# Patient Record
Sex: MALE
[De-identification: names, ages, dates, MRNs, and addresses within clinical notes are randomized per-mention and may not be internally consistent; named-entity substitution may affect disease eponyms.]

---

## 2018-05-02 ENCOUNTER — HOSPITAL ENCOUNTER (EMERGENCY)
Dept: HOSPITAL 97 - ER | Age: 23
Discharge: HOME | End: 2018-05-02
Payer: SELF-PAY

## 2018-05-02 VITALS — DIASTOLIC BLOOD PRESSURE: 70 MMHG | SYSTOLIC BLOOD PRESSURE: 130 MMHG

## 2018-05-02 VITALS — OXYGEN SATURATION: 100 %

## 2018-05-02 VITALS — TEMPERATURE: 98 F
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
## 2018-05-02 DIAGNOSIS — M25.511: Primary | ICD-10-CM
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
## 2018-05-02 PROCEDURE — 96372 THER/PROPH/DIAG INJ SC/IM: CPT

## 2018-05-02 PROCEDURE — 99284 EMERGENCY DEPT VISIT MOD MDM: CPT

## 2018-05-02 NOTE — RAD REPORT
EXAM DESCRIPTION:  RAD - Shoulder  Right 2 View - 5/2/2018 3:55 pm

 

CLINICAL HISTORY:  Right shoulder pain status post fall

 

FINDINGS:  No fracture or dislocation is seen.

## 2018-05-02 NOTE — EDPHYS
Physician Documentation                                                                           

 [facility]                                                                

Name: Fareed Dietrich                                                                                

Age: 22 yrs                                                                                       

Sex: Male                                                                                         

: 1995                                                                                   

MRN: [id number]                                                                                   

Arrival Date: 2018                                                                          

Time: 15:15                                                                                       

Account#: [id number]                                                                            

Bed 24                                                                                            

Private MD: None, None                                                                            

ED Physician Dex Dominguez                                                                      

HPI:                                                                                              

                                                                                             

15:43 This 22 yrs old  Male presents to ER via Ambulatory with complaints of Shoulder snw 

      Injury.                                                                                     

15:43 The patient or guardian complains of a crush injury, fell onto right shoulder. right    snw 

      shoulder. Context: resulted from a fall, playing sports, softball, The patient              

      experiences decreased range of motion, The patient reports no obvious deformity. Onset:     

      The symptoms/episode began/occurred suddenly, last night. Associated signs and              

      symptoms: Pertinent positives: pain and decreased ROM. Severity of symptoms: At their       

      worst the symptoms were moderate. The patient has not experienced similar symptoms in       

      the past. The patient has not recently seen a physician.                                    

                                                                                                  

Historical:                                                                                       

- Allergies:                                                                                      

15:19 No Known Allergies;                                                                     aj  

- Home Meds:                                                                                      

15:19 None [Active];                                                                          aj  

- PMHx:                                                                                           

15:19 None;                                                                                   aj  

- PSHx:                                                                                           

15:19 None;                                                                                   aj  

                                                                                                  

- Immunization history:: Adult Immunizations up to date.                                          

- Social history:: Smoking status: Patient uses tobacco products, denies chronic                  

  smoking, but will smoke occasionally.                                                           

                                                                                                  

                                                                                                  

ROS:                                                                                              

15:43 Constitutional: Negative for fever, chills, and weight loss, Eyes: Negative for injury, snw 

      pain, redness, and discharge, ENT: Negative for injury, pain, and discharge, Neck:          

      Negative for injury, pain, and swelling, Cardiovascular: Negative for chest pain,           

      palpitations, and edema, Respiratory: Negative for shortness of breath, cough,              

      wheezing, and pleuritic chest pain, Abdomen/GI: Negative for abdominal pain, nausea,        

      vomiting, diarrhea, and constipation, Back: Negative for injury and pain, : Negative      

      for injury, bleeding, discharge, and swelling, Skin: Negative for injury, rash, and         

      discoloration, Neuro: Negative for headache, weakness, numbness, tingling, and seizure.     

15:43 MS/extremity: Positive for injury or acute deformity, contusion, decreased range of         

      motion, pain, of the anterior aspect of right shoulder.                                     

                                                                                                  

Exam:                                                                                             

15:43 Constitutional:  This is a well developed, well nourished patient who is awake, alert,  snw 

      and in no acute distress. Head/Face:  Normocephalic, atraumatic. Eyes:  Pupils equal        

      round and reactive to light, extra-ocular motions intact.  Lids and lashes normal.          

      Conjunctiva and sclera are non-icteric and not injected.  Cornea within normal limits.      

      Periorbital areas with no swelling, redness, or edema. ENT:  Nares patent. No nasal         

      discharge, no septal abnormalities noted.  Tympanic membranes are normal and external       

      auditory canals are clear.  Oropharynx with no redness, swelling, or masses, exudates,      

      or evidence of obstruction, uvula midline.  Mucous membranes moist. Neck:  Trachea          

      midline, no thyromegaly or masses palpated, and no cervical lymphadenopathy.  Supple,       

      full range of motion without nuchal rigidity, or vertebral point tenderness.  No            

      Meningismus. Chest/axilla:  Normal chest wall appearance and motion.  Nontender with no     

      deformity.  No lesions are appreciated. Cardiovascular:  Regular rate and rhythm with a     

      normal S1 and S2.  No gallops, murmurs, or rubs.  Normal PMI, no JVD.  No pulse             

      deficits. Respiratory:  Lungs have equal breath sounds bilaterally, clear to                

      auscultation and percussion.  No rales, rhonchi or wheezes noted.  No increased work of     

      breathing, no retractions or nasal flaring. Abdomen/GI:  Soft, non-tender, with normal      

      bowel sounds.  No distension or tympany.  No guarding or rebound.  No evidence of           

      tenderness throughout. Back:  No spinal tenderness.  No costovertebral tenderness.          

      Full range of motion. Skin:  Warm, dry with normal turgor.  Normal color with no            

      rashes, no lesions, and no evidence of cellulitis. Neuro:  Awake and alert, GCS 15,         

      oriented to person, place, time, and situation.  Cranial nerves II-XII grossly intact.      

      Motor strength 5/5 in all extremities.  Sensory grossly intact.  Cerebellar exam            

      normal.  Normal gait. Psych:  Awake, alert, with orientation to person, place and time.     

       Behavior, mood, and affect are within normal limits.                                       

15:43 Musculoskeletal/extremity: Extremities: grossly normal except: ROM: limited active          

      range of motion, in the right shoulder, limited active range of motion due to pain,         

      Circulation is intact in all extremities. Sensation intact. Compartment Syndrome exam       

      of affected extremity: is normal. Joints: All joints are normal except the  right           

      shoulder displays limited range of motion, painful range of motion.                         

                                                                                                  

Vital Signs:                                                                                      

15:19  / 67; Pulse 60; Resp 16; Temp 98.0; Pulse Ox 99% on R/A; Weight 81.65 kg; Height aj  

      5 ft. 10 in. (177.80 cm); Pain 9/10;                                                        

15:57  / 72; Pulse 60; Resp 15; Pulse Ox 100% on R/A;                                   kr2 

17:33  / 70; Pulse 62; Resp 16; Pulse Ox 100% on R/A;                                   kr2 

15:19 Body Mass Index 25.83 (81.65 kg, 177.80 cm)                                             aj  

                                                                                                  

MDM:                                                                                              

15:29 Patient medically screened.                                                             snw 

16:43 Data reviewed: vital signs, nurses notes. Data interpreted: Pulse oximetry: on room air snw 

      is 100 %. Interpretation: normal. Counseling: I had a detailed discussion with the          

      patient and/or guardian regarding: the historical points, exam findings, and any            

      diagnostic results supporting the discharge/admit diagnosis, radiology results, the         

      need for outpatient follow up, to return to the emergency department if symptoms worsen     

      or persist or if there are any questions or concerns that arise at home. Special            

      discussion: I have referred the patient to see his PCP for further evaluation of high       

      blood pressure. Based on the history and exam findings, there is no indication for          

      further emergent testing or inpatient evaluation. I discussed with the patient/guardian     

      the need to see the orthopedic surgeon for further evaluation of the symptoms. I            

      discussed with the patient/guardian the need to see the primary care provider for           

      further evaluation of the symptoms.                                                         

                                                                                                  

                                                                                             

15:32 Order name: Shoulder Right (2 View) XRAY                                                kr2 

                                                                                             

16:42 Order name: Sling; Complete Time: 17:18                                                 snw 

                                                                                                  

Administered Medications:                                                                         

16:41 Drug: TORadol 60 mg Route: IM; Site: right gluteus;                                     kr2 

17:18 Follow up: Response: No adverse reaction; Pain is decreased                             kr2 

                                                                                                  

                                                                                                  

Disposition:                                                                                      

18 16:41 Discharged to Home. Impression: Pain in right shoulder.                            

- Condition is Stable.                                                                            

- Discharge Instructions: Arthralgia, Shoulder Pain, Arm Sling Use, Easy-to-Read,                 

  Cryotherapy, Easy-to-Read, Heat Therapy.                                                        

- Prescriptions for Diclofenac Sodium 75 mg Oral Tablet Sustained Release - take 1                

  tablet by ORAL route 2 times per day; 30 tablet. orphenadrine citrate 100 mg Oral               

  Tablet Sustained Release - take 1 tablet by ORAL route 2 times per day As needed; 20            

  tablet.                                                                                         

- Medication Reconciliation Form, Thank You Letter, Antibiotic Education, Prescription            

  Opioid Use, Work release form form.                                                             

- Follow up: Private Physician; When: 2 - 3 days; Reason: Recheck today's complaints,             

  Continuance of care, Re-evaluation by your physician. Follow up: Emergency                      

  Department; When: As needed; Reason: Worsening of condition.                                    

                                                                                                  

                                                                                                  

                                                                                                  

Addendum:                                                                                         

2018                                                                                        

     06:43 Co-signature as Attending Physician, Dex Dominguez MD I agree with the assessment and  w
a

           plan of care.                                                                          

                                                                                                  

Signatures:                                                                                       

Dispatcher [employer]                           Adrienne Vicente, YULISSA                       RN   Nanci Mckenzie, FNP-C                 FNP-Csnw                                                  

Dex Dominguez MD MD wa Reaves, Karey, RN                       RN   kr2                                                  

                                                                                                  

Corrections: (The following items were deleted from the chart)                                    

                                                                                             

17:34 16:41 2018 16:41 Discharged to Home. Impression: Pain in right shoulder.          kr2 

      Condition is Stable. Forms are Medication Reconciliation Form, Thank You Letter,            

      Antibiotic Education, Prescription Opioid Use. Follow up: Private Physician; When: 2 -      

      3 days; Reason: Recheck today's complaints, Continuance of care, Re-evaluation by your      

      physician. Follow up: Emergency Department; When: As needed; Reason: Worsening of           

      condition. snw                                                                              

                                                                                                  

**************************************************************************************************

## 2018-05-02 NOTE — ER
Nurse's Notes                                                                                     

 [facility]                                                                

Name: Fareed Dietrich                                                                                

Age: 22 yrs                                                                                       

Sex: Male                                                                                         

: 1995                                                                                   

MRN: [id number]                                                                                   

Arrival Date: 2018                                                                          

Time: 15:15                                                                                       

Account#: [id number]                                                                            

Bed 24                                                                                            

Private MD: None, None                                                                            

Diagnosis: Pain in right shoulder                                                                 

                                                                                                  

Presentation:                                                                                     

                                                                                             

15:17 Presenting complaint: Patient states: Right shoulder pain that started last night after aj  

      "diving for a ball while playing men's softball". Transition of care: patient was not       

      received from another setting of care. Onset of symptoms was May 01, 2018. Initial          

      Sepsis Screen: Does the patient meet any 2 criteria? No. Patient's initial sepsis           

      screen is negative. Does the patient have a suspected source of infection? No.              

      Patient's initial sepsis screen is negative. Care prior to arrival: None.                   

15:17 Method Of Arrival: Ambulatory                                                           aj  

15:17 Acuity: KAREEN 4                                                                           aj  

                                                                                                  

Triage Assessment:                                                                                

15:19 General: Appears in no apparent distress. comfortable, Behavior is calm, cooperative,   aj  

      appropriate for age. Pain: Complains of pain in anterior aspect of right shoulder.          

      Neuro: Level of Consciousness is awake, alert, obeys commands, Oriented to person,          

      place, time, situation, Appropriate for age. Respiratory: Airway is patent Respiratory      

      effort is even, unlabored, Respiratory pattern is regular, symmetrical. Derm: Skin is       

      intact, is healthy with good turgor, Skin is pink, warm \T\ dry. normal. Musculoskeletal:   

      Reports pain in anterior aspect of right shoulder and posterior aspect of right             

      shoulder.                                                                                   

                                                                                                  

Historical:                                                                                       

- Allergies:                                                                                      

15:19 No Known Allergies;                                                                       

- Home Meds:                                                                                      

15:19 None [Active];                                                                            

- PMHx:                                                                                           

15:19 None;                                                                                     

- PSHx:                                                                                           

15:19 None;                                                                                   aj  

                                                                                                  

- Immunization history:: Adult Immunizations up to date.                                          

- Social history:: Smoking status: Patient uses tobacco products, denies chronic                  

  smoking, but will smoke occasionally.                                                           

                                                                                                  

                                                                                                  

Screening:                                                                                        

15:25 Abuse screen: Denies threats or abuse. Denies injuries from another. Nutritional        kr2 

      screening: No deficits noted. Tuberculosis screening: No symptoms or risk factors           

      identified. Fall Risk None identified.                                                      

                                                                                                  

Assessment:                                                                                       

15:25 General: Appears in no apparent distress. comfortable, well groomed, well developed,    kr2 

      well nourished, Behavior is calm, cooperative, appropriate for age. Pain: Complains of      

      pain in right shoulder Pain radiates to right arm Pain currently is 0 out of 10 on a        

      pain scale. at worst was 8 out of 10 on a pain scale. Quality of pain is described as       

      sharp, shooting, Pain began last night Is intermittent, Alleviated by rest, Aggravated      

      by lifting arm. Neuro: Level of Consciousness is awake, alert, obeys commands, Oriented     

      to person, place, time, situation. Neuro: Intact. Cardiovascular: Capillary refill < 3      

      seconds in bilateral fingers Patient's skin is warm and dry. Respiratory: Airway is         

      patent Respiratory effort is even, unlabored, Respiratory pattern is regular,               

      symmetrical. GI: Abdomen is flat, non-distended. : No signs and/or symptoms were          

      reported regarding the genitourinary system. EENT: Oral mucosa is moist. Derm: Skin is      

      intact, is healthy with good turgor, Skin is pink, warm \T\ dry. Musculoskeletal:           

      Circulation, motion, and sensation intact. Injury Description: Patient states he fell       

      on his shoulder last night playing soft ball. States he felt a "pop".                       

16:30 Reassessment: Patient appears in no apparent distress at this time. Patient and/or      kr2 

      family updated on plan of care and expected duration. Pain level reassessed. Patient is     

      alert, oriented x 3, equal unlabored respirations, skin warm/dry/pink. Patient states       

      symptoms have improved.                                                                     

17:33 Reassessment: Patient appears in no apparent distress at this time. Patient and/or      kr2 

      family updated on plan of care and expected duration. Pain level reassessed. Patient is     

      alert, oriented x 3, equal unlabored respirations, skin warm/dry/pink. Patient states       

      symptoms have improved.                                                                     

                                                                                                  

Vital Signs:                                                                                      

15:19  / 67; Pulse 60; Resp 16; Temp 98.0; Pulse Ox 99% on R/A; Weight 81.65 kg; Height aj  

      5 ft. 10 in. (177.80 cm); Pain 9/10;                                                        

15:57  / 72; Pulse 60; Resp 15; Pulse Ox 100% on R/A;                                   kr2 

17:33  / 70; Pulse 62; Resp 16; Pulse Ox 100% on R/A;                                   kr2 

15:19 Body Mass Index 25.83 (81.65 kg, 177.80 cm)                                               

                                                                                                  

ED Course:                                                                                        

15:15 Patient arrived in ED.                                                                  mr  

15:15 None, None is Private Physician.                                                        mr  

15:18 Triage completed.                                                                       aj  

15:19 Arm band placed on left wrist. Patient placed in an exam room.                          aj  

15:20 Janette Valdes, RN is Primary Nurse.                                                     kr2 

15:25 Patient has correct armband on for positive identification. Bed in low position. Call   kr2 

      light in reach. Side rails up X 1. Adult w/ patient. Pulse ox on. NIBP on. Door closed.     

      Head of bed elevated.                                                                       

15:26 [facility]                                                        snw 

15:26 Dex Dominguez MD is Attending Physician.                                             snw 

15:52 X-ray completed. Portable x-ray completed in exam room. Patient tolerated procedure     ml  

      well.                                                                                       

15:53 Shoulder Right (2 View) XRAY In Process Unspecified.                                    EDMS

17:00 Sling applied to right arm.                                                             kr2 

17:19 No provider procedures requiring assistance completed. Patient did not have IV access   kr2 

      during this emergency room visit.                                                           

                                                                                                  

Administered Medications:                                                                         

16:41 Drug: TORadol 60 mg Route: IM; Site: right gluteus;                                     kr2 

17:18 Follow up: Response: No adverse reaction; Pain is decreased                             kr2 

                                                                                                  

                                                                                                  

Outcome:                                                                                          

16:41 Discharge ordered by MD.                                                                snw 

17:19 Discharged to home ambulatory, with family.                                             kr2 

17:19 Condition: good                                                                             

17:19 Discharge instructions given to patient, family, Instructed on discharge instructions,      

      follow up and referral plans. Demonstrated understanding of instructions, follow-up         

      care.                                                                                       

17:34 Patient left the ED.                                                                    kr2 

                                                                                                  

Signatures:                                                                                       

Dispatcher [employer]                           Adrienne Vicente, RN                       Nanci Swain FNP-C FNP-Nikc                                                  

Mary Simmons                                Inés Chu                                                                                  

Janette Valdes, RN                       RN   kr2                                                  

                                                                                                  

**************************************************************************************************